# Patient Record
Sex: MALE | Race: BLACK OR AFRICAN AMERICAN | Employment: UNEMPLOYED | URBAN - METROPOLITAN AREA
[De-identification: names, ages, dates, MRNs, and addresses within clinical notes are randomized per-mention and may not be internally consistent; named-entity substitution may affect disease eponyms.]

---

## 2018-07-06 ENCOUNTER — HOSPITAL ENCOUNTER (EMERGENCY)
Facility: HOSPITAL | Age: 63
Discharge: HOME | End: 2018-07-06
Payer: MEDICAID

## 2018-07-06 VITALS
TEMPERATURE: 99 F | DIASTOLIC BLOOD PRESSURE: 82 MMHG | SYSTOLIC BLOOD PRESSURE: 145 MMHG | HEART RATE: 72 BPM | HEIGHT: 71 IN | WEIGHT: 220 LBS | OXYGEN SATURATION: 98 % | RESPIRATION RATE: 16 BRPM | BODY MASS INDEX: 30.8 KG/M2

## 2018-07-06 DIAGNOSIS — B35.3 TINEA PEDIS OF BOTH FEET: Primary | ICD-10-CM

## 2018-07-06 PROCEDURE — 99283 EMERGENCY DEPT VISIT LOW MDM: CPT

## 2018-07-06 NOTE — ED INITIAL ASSESSMENT (HPI)
61 y/o male to ED with c/o of rash on bilateral toes. Patient reports he wears 2 pairs of socks, reports his feet sweat a lot. Reports he noticed rash x2 months ago.

## 2018-07-06 NOTE — ED PROVIDER NOTES
Patient Seen in: [facility] Emergency Department    History   Patient presents with:  Rash Skin Problem (integumentary)    Stated Complaint: rash on toes    WILMER Mar is a 78-year-old male who presents today for evaluation of a rash to his bilat Skin: Skin is warm and dry. He is not diaphoretic. Bilateral feet with interdigital erythema and cracked skin that is macerated and irritated. Nursing note and vitals reviewed.            ED Course   Labs Reviewed - No data to display    ED Course as

## 2018-07-06 NOTE — ED NOTES
Pt complains of rash and discoloration to the 4th and 5th toes of both feet for the past 2 months. Pt has been using over the counter hydrocortisone cream with no relief.   Pt has brownish discoloration of the dorsal toes with white peeling between the toe

## (undated) NOTE — ED AVS SNAPSHOT
Priya Boyle   MRN: [id number]    Department:  [facility] Emergency Department   Date of Visit:  7/6/2018           Disclosure     Insurance plans vary and the physician(s) referred by the ER may not be covered by your plan.  Please contact your in tell this physician (or your personal doctor if your instructions are to return to your personal doctor) about any new or lasting problems. The primary care or specialist physician will see patients referred from the [facility] Emergency Department.  Arthor Bernheim